# Patient Record
Sex: FEMALE
[De-identification: names, ages, dates, MRNs, and addresses within clinical notes are randomized per-mention and may not be internally consistent; named-entity substitution may affect disease eponyms.]

---

## 2017-10-02 ENCOUNTER — HOSPITAL ENCOUNTER (OUTPATIENT)
Dept: HOSPITAL 53 - M LAB REF | Age: 37
End: 2017-10-02
Attending: FAMILY MEDICINE
Payer: COMMERCIAL

## 2017-10-02 DIAGNOSIS — Z11.3: ICD-10-CM

## 2017-10-02 DIAGNOSIS — Z01.419: Primary | ICD-10-CM

## 2018-10-04 ENCOUNTER — HOSPITAL ENCOUNTER (OUTPATIENT)
Dept: HOSPITAL 53 - M WUC | Age: 38
End: 2018-10-04
Attending: PHYSICIAN ASSISTANT
Payer: COMMERCIAL

## 2018-10-04 DIAGNOSIS — Z83.3: ICD-10-CM

## 2018-10-04 DIAGNOSIS — L65.9: Primary | ICD-10-CM

## 2018-10-04 DIAGNOSIS — R53.83: ICD-10-CM

## 2018-10-04 LAB
ALBUMIN/GLOBULIN RATIO: 0.8 (ref 1–1.93)
ALBUMIN: 3.6 GM/DL (ref 3.2–5.2)
ALKALINE PHOSPHATASE: 108 U/L (ref 45–117)
ALT SERPL W P-5'-P-CCNC: 49 U/L (ref 12–78)
ANION GAP: 8 MEQ/L (ref 8–16)
AST SERPL-CCNC: 24 U/L (ref 7–37)
BASO #: 0 10^3/UL (ref 0–0.2)
BASO %: 0.3 % (ref 0–1)
BILIRUBIN,TOTAL: 0.3 MG/DL (ref 0.2–1)
BLOOD UREA NITROGEN: 13 MG/DL (ref 7–18)
CALCIUM LEVEL: 9.2 MG/DL (ref 8.5–10.1)
CARBON DIOXIDE LEVEL: 28 MEQ/L (ref 21–32)
CHLORIDE LEVEL: 104 MEQ/L (ref 98–107)
CREATININE FOR GFR: 0.69 MG/DL (ref 0.55–1.3)
EOS #: 0.1 10^3/UL (ref 0–0.5)
EOSINOPHIL NFR BLD AUTO: 1.3 % (ref 0–3)
FREE T4: 1.12 NG/DL (ref 0.76–1.46)
GFR SERPL CREATININE-BSD FRML MDRD: > 60 ML/MIN/{1.73_M2} (ref 60–?)
GLUCOSE, FASTING: 79 MG/DL (ref 70–100)
HEMATOCRIT: 41.6 % (ref 36–47)
HEMOGLOBIN: 13.2 G/DL (ref 12–15.5)
IMMATURE GRANULOCYTE %: 0.5 % (ref 0–3)
LYMPH #: 2.7 10^3/UL (ref 1.5–4.5)
LYMPH %: 28.1 % (ref 24–44)
MEAN CORPUSCULAR HEMOGLOBIN: 26.8 PG (ref 27–33)
MEAN CORPUSCULAR HGB CONC: 31.7 G/DL (ref 32–36.5)
MEAN CORPUSCULAR VOLUME: 84.4 FL (ref 80–96)
MONO #: 0.5 10^3/UL (ref 0–0.8)
MONO %: 5 % (ref 0–5)
NEUTROPHILS #: 6.1 10^3/UL (ref 1.8–7.7)
NEUTROPHILS %: 64.8 % (ref 36–66)
NRBC BLD AUTO-RTO: 0 % (ref 0–0)
PLATELET COUNT, AUTOMATED: 370 10^3/UL (ref 150–450)
POTASSIUM SERUM: 4.8 MEQ/L (ref 3.5–5.1)
RED BLOOD COUNT: 4.93 10^6/UL (ref 4–5.4)
RED CELL DISTRIBUTION WIDTH: 13 % (ref 11.5–14.5)
SODIUM LEVEL: 140 MEQ/L (ref 136–145)
THYROID STIMULATING HORMONE: 1.13 UIU/ML (ref 0.36–3.74)
TOTAL PROTEIN: 8.1 GM/DL (ref 6.4–8.2)
WHITE BLOOD COUNT: 9.4 10^3/UL (ref 4–10)

## 2018-10-11 ENCOUNTER — HOSPITAL ENCOUNTER (OUTPATIENT)
Dept: HOSPITAL 53 - M LAB REF | Age: 38
End: 2018-10-11
Attending: PHYSICIAN ASSISTANT
Payer: COMMERCIAL

## 2018-10-11 DIAGNOSIS — Z12.4: Primary | ICD-10-CM

## 2018-10-12 LAB — HPV LOW VOL RFLX: (no result)

## 2020-02-17 ENCOUNTER — HOSPITAL ENCOUNTER (OUTPATIENT)
Dept: HOSPITAL 53 - M LAB REF | Age: 40
End: 2020-02-17
Attending: NURSE PRACTITIONER
Payer: COMMERCIAL

## 2020-02-17 DIAGNOSIS — Z01.419: Primary | ICD-10-CM

## 2020-02-18 ENCOUNTER — HOSPITAL ENCOUNTER (OUTPATIENT)
Dept: HOSPITAL 53 - M WUC | Age: 40
End: 2020-02-18
Attending: NURSE PRACTITIONER
Payer: COMMERCIAL

## 2020-02-18 DIAGNOSIS — Z01.419: Primary | ICD-10-CM

## 2020-02-18 LAB
ALBUMIN SERPL BCG-MCNC: 3.7 GM/DL (ref 3.2–5.2)
ALT SERPL W P-5'-P-CCNC: 47 U/L (ref 12–78)
BILIRUB SERPL-MCNC: 0.5 MG/DL (ref 0.2–1)
BUN SERPL-MCNC: 12 MG/DL (ref 7–18)
CALCIUM SERPL-MCNC: 9.1 MG/DL (ref 8.5–10.1)
CHLORIDE SERPL-SCNC: 106 MEQ/L (ref 98–107)
CHOLEST SERPL-MCNC: 159 MG/DL (ref ?–200)
CHOLEST/HDLC SERPL: 3 {RATIO} (ref ?–5)
CO2 SERPL-SCNC: 29 MEQ/L (ref 21–32)
CREAT SERPL-MCNC: 0.65 MG/DL (ref 0.55–1.3)
GFR SERPL CREATININE-BSD FRML MDRD: > 60 ML/MIN/{1.73_M2} (ref 60–?)
GLUCOSE SERPL-MCNC: 91 MG/DL (ref 70–100)
HDLC SERPL-MCNC: 53 MG/DL (ref 40–?)
LDLC SERPL CALC-MCNC: 85 MG/DL (ref ?–100)
NONHDLC SERPL-MCNC: 106 MG/DL
POTASSIUM SERPL-SCNC: 4.6 MEQ/L (ref 3.5–5.1)
PROT SERPL-MCNC: 7.8 GM/DL (ref 6.4–8.2)
SODIUM SERPL-SCNC: 138 MEQ/L (ref 136–145)
T4 FREE SERPL-MCNC: 1.11 NG/DL (ref 0.76–1.46)
TRIGL SERPL-MCNC: 107 MG/DL (ref ?–150)
TSH SERPL DL<=0.005 MIU/L-ACNC: 1.09 UIU/ML (ref 0.36–3.74)

## 2020-03-12 ENCOUNTER — HOSPITAL ENCOUNTER (OUTPATIENT)
Dept: HOSPITAL 53 - M LAB REF | Age: 40
End: 2020-03-12
Attending: NURSE PRACTITIONER
Payer: COMMERCIAL

## 2020-03-12 DIAGNOSIS — R87.613: ICD-10-CM

## 2020-03-12 DIAGNOSIS — Z12.4: Primary | ICD-10-CM

## 2020-03-12 PROCEDURE — 87624 HPV HI-RISK TYP POOLED RSLT: CPT

## 2020-04-27 ENCOUNTER — HOSPITAL ENCOUNTER (OUTPATIENT)
Dept: HOSPITAL 53 - M SFHCWAGY | Age: 40
End: 2020-04-27
Attending: NURSE PRACTITIONER
Payer: COMMERCIAL

## 2020-04-27 DIAGNOSIS — R87.613: Primary | ICD-10-CM

## 2021-02-22 ENCOUNTER — HOSPITAL ENCOUNTER (OUTPATIENT)
Dept: HOSPITAL 53 - M WUC | Age: 41
End: 2021-02-22
Attending: NURSE PRACTITIONER
Payer: COMMERCIAL

## 2021-02-22 DIAGNOSIS — Z00.00: Primary | ICD-10-CM

## 2021-02-22 LAB
ALBUMIN SERPL BCG-MCNC: 3.4 GM/DL (ref 3.2–5.2)
ALT SERPL W P-5'-P-CCNC: 46 U/L (ref 12–78)
BILIRUB SERPL-MCNC: 0.4 MG/DL (ref 0.2–1)
BUN SERPL-MCNC: 10 MG/DL (ref 7–18)
CALCIUM SERPL-MCNC: 9 MG/DL (ref 8.5–10.1)
CHLORIDE SERPL-SCNC: 104 MEQ/L (ref 98–107)
CHOLEST SERPL-MCNC: 160 MG/DL (ref ?–200)
CHOLEST/HDLC SERPL: 3.02 {RATIO} (ref ?–5)
CO2 SERPL-SCNC: 27 MEQ/L (ref 21–32)
CREAT SERPL-MCNC: 0.74 MG/DL (ref 0.55–1.3)
GFR SERPL CREATININE-BSD FRML MDRD: > 60 ML/MIN/{1.73_M2} (ref 58–?)
GLUCOSE SERPL-MCNC: 106 MG/DL (ref 70–100)
HDLC SERPL-MCNC: 53 MG/DL (ref 40–?)
LDLC SERPL CALC-MCNC: 88 MG/DL (ref ?–100)
NONHDLC SERPL-MCNC: 107 MG/DL
POTASSIUM SERPL-SCNC: 4.1 MEQ/L (ref 3.5–5.1)
PROT SERPL-MCNC: 7.5 GM/DL (ref 6.4–8.2)
SODIUM SERPL-SCNC: 138 MEQ/L (ref 136–145)
TRIGL SERPL-MCNC: 93 MG/DL (ref ?–150)

## 2021-03-08 ENCOUNTER — HOSPITAL ENCOUNTER (OUTPATIENT)
Dept: HOSPITAL 53 - M WHC | Age: 41
End: 2021-03-08
Attending: NURSE PRACTITIONER
Payer: COMMERCIAL

## 2021-03-08 DIAGNOSIS — Z12.31: Primary | ICD-10-CM

## 2021-03-09 NOTE — REPMRS
Patient History

The patient states she had a clinical breast exam in 2/2021

No known family history of cancer.

 

Digital Woman Screen Mammo: March 8, 2021 - Exam #: 

[id number]

Bilateral CC and MLO view(s) were taken.

 

Technologist: Harriett Epsino, Technologist

Prior study comparison: 2020, bilateral digital mammo screening 

bilat, performed at [facility].

 

FINDINGS:  The breast tissue is almost entirely fat.  The Volpara

volumetric breast density category is: A.  There has been no 

change in the appearance of the mammogram from the prior studies.

There is no interval development of dominant mass, 

architectural distortion, or grouped microcalcification typical 

of malignancy.

3-D tomosynthesis shows no additional findings.

 

Assessment: BI-RADS/ACR category 1 mammogram. Negative Mammogram.

 

Recommendation

Routine screening mammogram of both breasts in 1 year (for women 

over age 40).

This patient's [facility] Lifetime Breast Cancer RIsk is 

estimated at 11.1 %.

This mammogram was interpreted with the aid of an FDA-approved 

computer-aided dectection system.

 

Electronically Signed By: Alan Beal MD 03/09/21 0842

## 2021-04-14 ENCOUNTER — HOSPITAL ENCOUNTER (OUTPATIENT)
Dept: HOSPITAL 53 - M SFHCWAGY | Age: 41
End: 2021-04-14
Attending: NURSE PRACTITIONER
Payer: COMMERCIAL

## 2021-04-14 DIAGNOSIS — Z12.4: Primary | ICD-10-CM

## 2021-04-14 DIAGNOSIS — R87.810: ICD-10-CM

## 2021-04-14 DIAGNOSIS — R87.620: ICD-10-CM

## 2021-04-14 PROCEDURE — 87624 HPV HI-RISK TYP POOLED RSLT: CPT

## 2021-05-17 ENCOUNTER — HOSPITAL ENCOUNTER (OUTPATIENT)
Dept: HOSPITAL 53 - M SFHCWAGY | Age: 41
End: 2021-05-17
Attending: NURSE PRACTITIONER
Payer: COMMERCIAL

## 2021-05-17 DIAGNOSIS — R87.613: Primary | ICD-10-CM

## 2021-06-09 ENCOUNTER — HOSPITAL ENCOUNTER (OUTPATIENT)
Dept: HOSPITAL 53 - M SFHCWAGY | Age: 41
End: 2021-06-09
Attending: OBSTETRICS & GYNECOLOGY
Payer: COMMERCIAL

## 2021-06-09 DIAGNOSIS — D06.0: Primary | ICD-10-CM

## 2021-06-30 ENCOUNTER — HOSPITAL ENCOUNTER (EMERGENCY)
Dept: HOSPITAL 53 - M ED | Age: 41
Discharge: HOME | End: 2021-06-30
Payer: COMMERCIAL

## 2021-06-30 VITALS — HEIGHT: 66 IN | WEIGHT: 224.87 LBS | BODY MASS INDEX: 36.14 KG/M2

## 2021-06-30 VITALS — SYSTOLIC BLOOD PRESSURE: 116 MMHG | DIASTOLIC BLOOD PRESSURE: 68 MMHG

## 2021-06-30 DIAGNOSIS — R07.89: Primary | ICD-10-CM

## 2021-06-30 DIAGNOSIS — Z88.8: ICD-10-CM

## 2021-06-30 DIAGNOSIS — R11.0: ICD-10-CM

## 2021-06-30 LAB
ALBUMIN SERPL BCG-MCNC: 3.6 GM/DL (ref 3.2–5.2)
ALT SERPL W P-5'-P-CCNC: 46 U/L (ref 12–78)
BASOPHILS # BLD AUTO: 0 10^3/UL (ref 0–0.2)
BASOPHILS NFR BLD AUTO: 0.2 % (ref 0–1)
BILIRUB CONJ SERPL-MCNC: 0.1 MG/DL (ref 0–0.2)
BILIRUB SERPL-MCNC: 0.2 MG/DL (ref 0.2–1)
BUN SERPL-MCNC: 11 MG/DL (ref 7–18)
CALCIUM SERPL-MCNC: 9.6 MG/DL (ref 8.5–10.1)
CHLORIDE SERPL-SCNC: 106 MEQ/L (ref 98–107)
CK MB CFR.DF SERPL CALC: 1.16
CK MB CFR.DF SERPL CALC: 1.32
CK MB SERPL-MCNC: < 1 NG/ML (ref ?–3.6)
CK MB SERPL-MCNC: < 1 NG/ML (ref ?–3.6)
CK SERPL-CCNC: 76 U/L (ref 26–192)
CK SERPL-CCNC: 86 U/L (ref 26–192)
CO2 SERPL-SCNC: 27 MEQ/L (ref 21–32)
CREAT SERPL-MCNC: 0.7 MG/DL (ref 0.55–1.3)
EOSINOPHIL # BLD AUTO: 0.1 10^3/UL (ref 0–0.5)
EOSINOPHIL NFR BLD AUTO: 1.1 % (ref 0–3)
GFR SERPL CREATININE-BSD FRML MDRD: > 60 ML/MIN/{1.73_M2} (ref 58–?)
GLUCOSE SERPL-MCNC: 94 MG/DL (ref 70–100)
HCT VFR BLD AUTO: 43 % (ref 36–47)
HGB BLD-MCNC: 13.6 G/DL (ref 12–15.5)
INR PPP: 0.92
LIPASE SERPL-CCNC: 169 U/L (ref 73–393)
LYMPHOCYTES # BLD AUTO: 2.3 10^3/UL (ref 1.5–5)
LYMPHOCYTES NFR BLD AUTO: 25.4 % (ref 24–44)
MCH RBC QN AUTO: 26.8 PG (ref 27–33)
MCHC RBC AUTO-ENTMCNC: 31.6 G/DL (ref 32–36.5)
MCV RBC AUTO: 84.8 FL (ref 80–96)
MONOCYTES # BLD AUTO: 0.5 10^3/UL (ref 0–0.8)
MONOCYTES NFR BLD AUTO: 5.4 % (ref 2–8)
NEUTROPHILS # BLD AUTO: 6.2 10^3/UL (ref 1.5–8.5)
NEUTROPHILS NFR BLD AUTO: 67.7 % (ref 36–66)
PLATELET # BLD AUTO: 405 10^3/UL (ref 150–450)
POTASSIUM SERPL-SCNC: 4.4 MEQ/L (ref 3.5–5.1)
PROT SERPL-MCNC: 8.3 GM/DL (ref 6.4–8.2)
PROTHROMBIN TIME: 12.5 SECONDS (ref 12.5–14.3)
RBC # BLD AUTO: 5.07 10^6/UL (ref 4–5.4)
SODIUM SERPL-SCNC: 141 MEQ/L (ref 136–145)
TROPONIN I SERPL-MCNC: < 0.02 NG/ML (ref ?–0.1)
TROPONIN I SERPL-MCNC: < 0.02 NG/ML (ref ?–0.1)
TSH SERPL DL<=0.005 MIU/L-ACNC: 1.18 UIU/ML (ref 0.36–3.74)
WBC # BLD AUTO: 9.1 10^3/UL (ref 4–10)

## 2021-06-30 NOTE — REP
INDICATION:

CHEST PAIN



COMPARISON:

None.



TECHNIQUE:

Portable AP view of the chest



FINDINGS:

The mediastinum and cardiac silhouette are within normal limits for portable

technique.  The lung fields are clear without acute consolidation, effusion, or

pneumothorax.  Skeletal structures are intact.



IMPRESSION:

No acute cardiopulmonary process appreciated.





<Electronically signed by John Snow > 06/30/21 0803

## 2021-07-01 NOTE — ECGEPIP
[facility] - ED

                                       

                                       Test Date:    2021

Pat Name:     JACKSON TEIXEIRA            Department:   

Patient ID:   [id number]                 Room:         -

Gender:       Female                   Technician:   SATURNINO

:          1980               Requested By: LITZY HERNANDEZ

Order Number: [id number]     Reading MD:   Linda Wells

                                 Measurements

Intervals                              Axis          

Rate:         61                       P:            63

WY:           170                      QRS:          36

QRSD:         94                       T:            37

QT:           426                                    

QTc:          428                                    

                           Interpretive Statements

Normal sinus rhythm

No prior

Electronically Signed on 2021 17:52:34 EDT by Linda Wells

## 2021-07-01 NOTE — ECGEPIP
[facility] - ED

                                       

                                       Test Date:    2021

Pat Name:     JACKSON TEIXEIRA            Department:   

Patient ID:   [id number]                 Room:         -

Gender:       Female                   Technician:   SATURNINO

:          1980               Requested By: JASON ODONNELL

Order Number: [id number]     Reading MD:   Linda Wells

                                 Measurements

Intervals                              Axis          

Rate:         58                       P:            64

AL:           182                      QRS:          47

QRSD:         84                       T:            41

QT:           448                                    

QTc:          439                                    

                           Interpretive Statements

Sinus bradycardia

similar 21

Electronically Signed on 2021 17:54:10 EDT by Linda Wells

## 2021-12-27 ENCOUNTER — HOSPITAL ENCOUNTER (OUTPATIENT)
Dept: HOSPITAL 53 - M WUC | Age: 41
End: 2021-12-27
Attending: NURSE PRACTITIONER
Payer: COMMERCIAL

## 2021-12-27 DIAGNOSIS — R53.83: Primary | ICD-10-CM

## 2021-12-27 LAB
ALBUMIN SERPL BCG-MCNC: 3.5 GM/DL (ref 3.2–5.2)
ALT SERPL W P-5'-P-CCNC: 61 U/L (ref 12–78)
BASOPHILS # BLD AUTO: 0 10^3/UL (ref 0–0.2)
BASOPHILS NFR BLD AUTO: 0.3 % (ref 0–1)
BILIRUB SERPL-MCNC: 0.3 MG/DL (ref 0.2–1)
BUN SERPL-MCNC: 9 MG/DL (ref 7–18)
CALCIUM SERPL-MCNC: 9.4 MG/DL (ref 8.5–10.1)
CHLORIDE SERPL-SCNC: 104 MEQ/L (ref 98–107)
CHOLEST SERPL-MCNC: 170 MG/DL (ref ?–200)
CHOLEST/HDLC SERPL: 3.21 {RATIO} (ref ?–5)
CO2 SERPL-SCNC: 27 MEQ/L (ref 21–32)
CREAT SERPL-MCNC: 0.58 MG/DL (ref 0.55–1.3)
EOSINOPHIL # BLD AUTO: 0.2 10^3/UL (ref 0–0.5)
EOSINOPHIL NFR BLD AUTO: 1.6 % (ref 0–3)
EST. AVERAGE GLUCOSE BLD GHB EST-MCNC: 120 MG/DL (ref 60–110)
FERRITIN SERPL-MCNC: 113 NG/ML (ref 8–252)
GFR SERPL CREATININE-BSD FRML MDRD: > 60 ML/MIN/{1.73_M2} (ref 58–?)
GLUCOSE SERPL-MCNC: 88 MG/DL (ref 70–100)
HCT VFR BLD AUTO: 41.4 % (ref 36–47)
HDLC SERPL-MCNC: 53 MG/DL (ref 40–?)
HGB BLD-MCNC: 12.9 G/DL (ref 12–15.5)
IRON SATN MFR SERPL: 19.9 % (ref 13.2–45)
IRON SERPL-MCNC: 69 UG/DL (ref 50–170)
LDLC SERPL CALC-MCNC: 79 MG/DL (ref ?–100)
LYMPHOCYTES # BLD AUTO: 3.5 10^3/UL (ref 1.5–5)
LYMPHOCYTES NFR BLD AUTO: 35.9 % (ref 24–44)
MCH RBC QN AUTO: 26.4 PG (ref 27–33)
MCHC RBC AUTO-ENTMCNC: 31.2 G/DL (ref 32–36.5)
MCV RBC AUTO: 84.7 FL (ref 80–96)
MONOCYTES # BLD AUTO: 0.6 10^3/UL (ref 0–0.8)
MONOCYTES NFR BLD AUTO: 6.4 % (ref 2–8)
NEUTROPHILS # BLD AUTO: 5.4 10^3/UL (ref 1.5–8.5)
NEUTROPHILS NFR BLD AUTO: 55.3 % (ref 36–66)
NONHDLC SERPL-MCNC: 117 MG/DL
PLATELET # BLD AUTO: 404 10^3/UL (ref 150–450)
POTASSIUM SERPL-SCNC: 4.5 MEQ/L (ref 3.5–5.1)
PROT SERPL-MCNC: 7.7 GM/DL (ref 6.4–8.2)
RBC # BLD AUTO: 4.89 10^6/UL (ref 4–5.4)
SODIUM SERPL-SCNC: 137 MEQ/L (ref 136–145)
T4 FREE SERPL-MCNC: 1.13 NG/DL (ref 0.76–1.46)
TIBC SERPL-MCNC: 346 UG/DL (ref 250–450)
TRIGL SERPL-MCNC: 191 MG/DL (ref ?–150)
TSH SERPL DL<=0.005 MIU/L-ACNC: 0.87 UIU/ML (ref 0.36–3.74)
WBC # BLD AUTO: 9.7 10^3/UL (ref 4–10)

## 2022-04-19 ENCOUNTER — HOSPITAL ENCOUNTER (OUTPATIENT)
Dept: HOSPITAL 53 - M SFHCWAGY | Age: 42
End: 2022-04-19
Attending: OBSTETRICS & GYNECOLOGY
Payer: COMMERCIAL

## 2022-04-19 ENCOUNTER — HOSPITAL ENCOUNTER (OUTPATIENT)
Dept: HOSPITAL 53 - M PLALAB | Age: 42
End: 2022-04-19
Attending: OBSTETRICS & GYNECOLOGY
Payer: COMMERCIAL

## 2022-04-19 DIAGNOSIS — Z12.4: Primary | ICD-10-CM

## 2022-04-19 DIAGNOSIS — R30.0: ICD-10-CM

## 2022-04-19 DIAGNOSIS — N76.0: Primary | ICD-10-CM

## 2022-04-19 LAB — N GONORRHOEA RRNA SPEC QL NAA+PROBE: NEGATIVE

## 2022-04-19 PROCEDURE — 87624 HPV HI-RISK TYP POOLED RSLT: CPT

## 2022-05-13 ENCOUNTER — HOSPITAL ENCOUNTER (OUTPATIENT)
Dept: HOSPITAL 53 - M WHC | Age: 42
End: 2022-05-13
Attending: FAMILY MEDICINE
Payer: COMMERCIAL

## 2022-05-13 DIAGNOSIS — Z12.31: Primary | ICD-10-CM

## 2023-02-20 ENCOUNTER — HOSPITAL ENCOUNTER (OUTPATIENT)
Dept: HOSPITAL 53 - M WUC | Age: 43
End: 2023-02-20
Payer: COMMERCIAL

## 2023-02-20 DIAGNOSIS — R73.03: ICD-10-CM

## 2023-02-20 DIAGNOSIS — Z00.00: Primary | ICD-10-CM

## 2023-02-20 LAB
ALBUMIN SERPL BCG-MCNC: 3.3 G/DL (ref 3.2–5.2)
ALP SERPL-CCNC: 104 U/L (ref 46–116)
ALT SERPL W P-5'-P-CCNC: 73 U/L (ref 7–40)
AST SERPL-CCNC: 42 U/L (ref ?–34)
BASOPHILS # BLD AUTO: 0 10^3/UL (ref 0–0.2)
BASOPHILS NFR BLD AUTO: 0.4 % (ref 0–1)
BILIRUB SERPL-MCNC: 0.4 MG/DL (ref 0.3–1.2)
BUN SERPL-MCNC: 9 MG/DL (ref 9–23)
CALCIUM SERPL-MCNC: 9.1 MG/DL (ref 8.5–10.1)
CHLORIDE SERPL-SCNC: 105 MMOL/L (ref 98–107)
CHOLEST SERPL-MCNC: 129 MG/DL (ref ?–200)
CHOLEST/HDLC SERPL: 2.38 {RATIO} (ref ?–5)
CO2 SERPL-SCNC: 29 MMOL/L (ref 20–31)
CREAT SERPL-MCNC: 0.63 MG/DL (ref 0.55–1.3)
EOSINOPHIL # BLD AUTO: 0.1 10^3/UL (ref 0–0.5)
EOSINOPHIL NFR BLD AUTO: 1.3 % (ref 0–3)
EST. AVERAGE GLUCOSE BLD GHB EST-MCNC: 134 MG/DL (ref 60–110)
GFR SERPL CREATININE-BSD FRML MDRD: > 60 ML/MIN/{1.73_M2} (ref 58–?)
GLUCOSE SERPL-MCNC: 94 MG/DL (ref 60–100)
HCT VFR BLD AUTO: 41.3 % (ref 36–47)
HDLC SERPL-MCNC: 54.2 MG/DL (ref 40–?)
HGB BLD-MCNC: 12.9 G/DL (ref 12–15.5)
LDLC SERPL CALC-MCNC: 52.2 MG/DL (ref ?–100)
LYMPHOCYTES # BLD AUTO: 3.2 10^3/UL (ref 1.5–5)
LYMPHOCYTES NFR BLD AUTO: 35.1 % (ref 24–44)
MCH RBC QN AUTO: 26.9 PG (ref 27–33)
MCHC RBC AUTO-ENTMCNC: 31.2 G/DL (ref 32–36.5)
MCV RBC AUTO: 86.2 FL (ref 80–96)
MONOCYTES # BLD AUTO: 0.5 10^3/UL (ref 0–0.8)
MONOCYTES NFR BLD AUTO: 5.5 % (ref 2–8)
NEUTROPHILS # BLD AUTO: 5.2 10^3/UL (ref 1.5–8.5)
NEUTROPHILS NFR BLD AUTO: 57.3 % (ref 36–66)
NONHDLC SERPL-MCNC: 75 MG/DL
PLATELET # BLD AUTO: 353 10^3/UL (ref 150–450)
POTASSIUM SERPL-SCNC: 4.6 MMOL/L (ref 3.5–5.1)
PROT SERPL-MCNC: 7 G/DL (ref 5.7–8.2)
RBC # BLD AUTO: 4.79 10^6/UL (ref 4–5.4)
SODIUM SERPL-SCNC: 140 MMOL/L (ref 136–145)
TRIGL SERPL-MCNC: 113 MG/DL (ref ?–150)
WBC # BLD AUTO: 9.1 10^3/UL (ref 4–10)

## 2023-05-16 ENCOUNTER — HOSPITAL ENCOUNTER (OUTPATIENT)
Dept: HOSPITAL 53 - M WHC | Age: 43
End: 2023-05-16
Payer: COMMERCIAL

## 2023-05-16 DIAGNOSIS — Z12.31: Primary | ICD-10-CM

## 2023-06-06 ENCOUNTER — HOSPITAL ENCOUNTER (OUTPATIENT)
Dept: HOSPITAL 53 - M WUC | Age: 43
End: 2023-06-06
Payer: COMMERCIAL

## 2023-06-06 DIAGNOSIS — R73.03: ICD-10-CM

## 2023-06-06 DIAGNOSIS — R94.5: Primary | ICD-10-CM

## 2023-06-06 LAB
ALBUMIN SERPL BCG-MCNC: 3.5 G/DL (ref 3.2–5.2)
ALP SERPL-CCNC: 100 U/L (ref 46–116)
ALT SERPL W P-5'-P-CCNC: 53 U/L (ref 7–40)
AST SERPL-CCNC: 34 U/L (ref ?–34)
BILIRUB CONJ SERPL-MCNC: 0.2 MG/DL (ref ?–0.4)
BILIRUB SERPL-MCNC: 0.6 MG/DL (ref 0.3–1.2)
EST. AVERAGE GLUCOSE BLD GHB EST-MCNC: 114 MG/DL (ref 60–110)
PROT SERPL-MCNC: 7 G/DL (ref 5.7–8.2)

## 2023-09-11 ENCOUNTER — HOSPITAL ENCOUNTER (OUTPATIENT)
Dept: HOSPITAL 53 - M SFHCWAGY | Age: 43
End: 2023-09-11
Attending: OBSTETRICS & GYNECOLOGY
Payer: COMMERCIAL

## 2023-09-11 DIAGNOSIS — Z12.4: Primary | ICD-10-CM

## 2023-09-11 PROCEDURE — 87624 HPV HI-RISK TYP POOLED RSLT: CPT

## 2024-03-13 ENCOUNTER — HOSPITAL ENCOUNTER (OUTPATIENT)
Dept: HOSPITAL 53 - M WUC | Age: 44
End: 2024-03-13
Payer: COMMERCIAL

## 2024-03-13 DIAGNOSIS — R73.03: Primary | ICD-10-CM

## 2024-03-13 LAB
ALBUMIN SERPL BCG-MCNC: 3.2 G/DL (ref 3.2–5.2)
ALP SERPL-CCNC: 115 U/L (ref 46–116)
ALT SERPL W P-5'-P-CCNC: 61 U/L (ref 7–40)
AST SERPL-CCNC: 30 U/L (ref ?–34)
BILIRUB SERPL-MCNC: 0.4 MG/DL (ref 0.3–1.2)
BUN SERPL-MCNC: 11 MG/DL (ref 9–23)
CALCIUM SERPL-MCNC: 8.4 MG/DL (ref 8.5–10.1)
CHLORIDE SERPL-SCNC: 103 MMOL/L (ref 98–107)
CHOLEST SERPL-MCNC: 147 MG/DL (ref ?–200)
CHOLEST/HDLC SERPL: 2.5 {RATIO} (ref ?–5)
CO2 SERPL-SCNC: 30 MMOL/L (ref 20–31)
CREAT SERPL-MCNC: 0.6 MG/DL (ref 0.55–1.3)
EST. AVERAGE GLUCOSE BLD GHB EST-MCNC: 146 MG/DL (ref 60–110)
GFR SERPL CREATININE-BSD FRML MDRD: > 60 ML/MIN/{1.73_M2} (ref 58–?)
GLUCOSE SERPL-MCNC: 95 MG/DL (ref 60–100)
HDLC SERPL-MCNC: 58.8 MG/DL (ref 40–?)
LDLC SERPL CALC-MCNC: 69.6 MG/DL (ref ?–100)
NONHDLC SERPL-MCNC: 88.2 MG/DL
POTASSIUM SERPL-SCNC: 4.5 MMOL/L (ref 3.5–5.1)
PROT SERPL-MCNC: 7.1 G/DL (ref 5.7–8.2)
SODIUM SERPL-SCNC: 139 MMOL/L (ref 136–145)
TRIGL SERPL-MCNC: 93 MG/DL (ref ?–150)